# Patient Record
Sex: FEMALE | Race: WHITE
[De-identification: names, ages, dates, MRNs, and addresses within clinical notes are randomized per-mention and may not be internally consistent; named-entity substitution may affect disease eponyms.]

---

## 2017-02-11 ENCOUNTER — HOSPITAL ENCOUNTER (EMERGENCY)
Dept: HOSPITAL 61 - ER | Age: 27
Discharge: HOME | End: 2017-02-11
Payer: COMMERCIAL

## 2017-02-11 VITALS
SYSTOLIC BLOOD PRESSURE: 104 MMHG | DIASTOLIC BLOOD PRESSURE: 64 MMHG | SYSTOLIC BLOOD PRESSURE: 104 MMHG | DIASTOLIC BLOOD PRESSURE: 64 MMHG | DIASTOLIC BLOOD PRESSURE: 64 MMHG | SYSTOLIC BLOOD PRESSURE: 104 MMHG

## 2017-02-11 VITALS — BODY MASS INDEX: 32.14 KG/M2 | HEIGHT: 66 IN | WEIGHT: 200 LBS

## 2017-02-11 DIAGNOSIS — E87.6: ICD-10-CM

## 2017-02-11 DIAGNOSIS — Z90.49: ICD-10-CM

## 2017-02-11 DIAGNOSIS — R19.7: ICD-10-CM

## 2017-02-11 DIAGNOSIS — Z88.5: ICD-10-CM

## 2017-02-11 DIAGNOSIS — N39.0: Primary | ICD-10-CM

## 2017-02-11 LAB
ALBUMIN SERPL-MCNC: 3.8 G/DL (ref 3.4–5)
ALBUMIN/GLOB SERPL: 0.9 {RATIO} (ref 1–1.7)
ALP SERPL-CCNC: 58 U/L (ref 46–116)
ALT SERPL-CCNC: 24 U/L (ref 14–59)
ANION GAP SERPL CALC-SCNC: 16 MMOL/L (ref 6–14)
AST SERPL-CCNC: 37 U/L (ref 15–37)
BACTERIA #/AREA URNS HPF: 0 /HPF
BASOPHILS # BLD AUTO: 0 X10^3/UL (ref 0–0.2)
BASOPHILS NFR BLD: 0 % (ref 0–3)
BILIRUB SERPL-MCNC: 0.4 MG/DL (ref 0.2–1)
BILIRUB UR QL STRIP: NEGATIVE
BUN SERPL-MCNC: 19 MG/DL (ref 7–20)
BUN/CREAT SERPL: 21 (ref 6–20)
CALCIUM SERPL-MCNC: 9 MG/DL (ref 8.5–10.1)
CHLORIDE SERPL-SCNC: 101 MMOL/L (ref 98–107)
CO2 SERPL-SCNC: 20 MMOL/L (ref 21–32)
CREAT SERPL-MCNC: 0.9 MG/DL (ref 0.6–1)
EOSINOPHIL NFR BLD: 1 % (ref 0–3)
ERYTHROCYTE [DISTWIDTH] IN BLOOD BY AUTOMATED COUNT: 13.1 % (ref 11.5–14.5)
GFR SERPLBLD BASED ON 1.73 SQ M-ARVRAT: 74.6 ML/MIN
GLOBULIN SER-MCNC: 4.4 G/DL (ref 2.2–3.8)
GLUCOSE SERPL-MCNC: 75 MG/DL (ref 70–99)
GLUCOSE UR STRIP-MCNC: NEGATIVE MG/DL
HCT VFR BLD CALC: 45 % (ref 36–47)
HGB BLD-MCNC: 15.4 G/DL (ref 12–15.5)
LYMPHOCYTES # BLD: 1.4 X10^3/UL (ref 1–4.8)
LYMPHOCYTES NFR BLD AUTO: 17 % (ref 24–48)
MCH RBC QN AUTO: 30 PG (ref 25–35)
MCHC RBC AUTO-ENTMCNC: 34 G/DL (ref 31–37)
MCV RBC AUTO: 86 FL (ref 79–100)
MONOCYTES NFR BLD: 8 % (ref 0–9)
NEUTROPHILS NFR BLD AUTO: 74 % (ref 31–73)
NITRITE UR QL STRIP: NEGATIVE
PH UR STRIP: 6.5 [PH]
PLATELET # BLD AUTO: 264 X10^3/UL (ref 140–400)
POTASSIUM SERPL-SCNC: 3 MMOL/L (ref 3.5–5.1)
PROT SERPL-MCNC: 8.2 G/DL (ref 6.4–8.2)
PROT UR STRIP-MCNC: 30 MG/DL
RBC # BLD AUTO: 5.23 X10^6/UL (ref 3.5–5.4)
RBC #/AREA URNS HPF: 0 /HPF (ref 0–2)
SODIUM SERPL-SCNC: 137 MMOL/L (ref 136–145)
SP GR UR STRIP: >=1.03
SQUAMOUS #/AREA URNS LPF: (no result) /LPF
UROBILINOGEN UR-MCNC: 0.2 MG/DL
WBC # BLD AUTO: 8.6 X10^3/UL (ref 4–11)
WBC #/AREA URNS HPF: (no result) /HPF (ref 0–4)

## 2017-02-11 PROCEDURE — 81001 URINALYSIS AUTO W/SCOPE: CPT

## 2017-02-11 PROCEDURE — S0028 INJECTION, FAMOTIDINE, 20 MG: HCPCS

## 2017-02-11 PROCEDURE — 96361 HYDRATE IV INFUSION ADD-ON: CPT

## 2017-02-11 PROCEDURE — 99285 EMERGENCY DEPT VISIT HI MDM: CPT

## 2017-02-11 PROCEDURE — 81025 URINE PREGNANCY TEST: CPT

## 2017-02-11 PROCEDURE — 74177 CT ABD & PELVIS W/CONTRAST: CPT

## 2017-02-11 PROCEDURE — 36415 COLL VENOUS BLD VENIPUNCTURE: CPT

## 2017-02-11 PROCEDURE — 83690 ASSAY OF LIPASE: CPT

## 2017-02-11 PROCEDURE — 85027 COMPLETE CBC AUTOMATED: CPT

## 2017-02-11 PROCEDURE — 96375 TX/PRO/DX INJ NEW DRUG ADDON: CPT

## 2017-02-11 PROCEDURE — 80053 COMPREHEN METABOLIC PANEL: CPT

## 2017-02-11 PROCEDURE — 96365 THER/PROPH/DIAG IV INF INIT: CPT

## 2017-02-11 NOTE — RAD
ABD PELV W/ IV CONTRAST ONLY



History:Upper abdominal pain, right upper quadrant pain, diarrhea.



Technique: After administration of intravenous contrast, CT imaging was

performed of the abdomen and pelvis, multiplanar reconstruction images

submitted. No oral contrast was given as per request. Exposure: One or more of

the following individualized dose reduction techniques were utilized for this

exam: 1. Automated exposure control.

2. Adjustment of the mA and/or KV according to patient size. 3. Use of

iterative reconstruction technique.



Contrast: 75 cc Omnipaque 300.



Comparison: None



Findings:There is no abnormality of the limited visualized lung bases. There

has been cholecystectomy. No focal abnormality is identified of the liver,

spleen, pancreas. Both kidneys enhance, no hydronephrosis. Incidental note is

made of a retroaortic left renal vein. Focus of hypodensity near the left

adrenal gland is likely due to gastric diverticulum. There is no discrete

adrenal nodularity. Appendix caliber is upper limits of normal 0.6 cm although

mostly filled with gas, no significant adjacent inflammatory type change.

Accurate evaluation of bowel is limited without oral contrast. Bowel is not

considered significantly dilated. There is no free air. There is some fluid

within the colon as may be seen with diarrheal state. There is no appreciable

bowel wall thickening. There is IUD present. There is no significant

extraluminal fluid collection. There is mild sigmoid diverticulosis. 





Impression:

1.There are some air-fluid levels of the colon which may be seen with

diarrheal state. Appendix caliber is considered borderline 0.6 cm although no

other findings suggestive of acute appendicitis. There is mild sigmoid

diverticulosis.

2. There has been cholecystectomy.

3. There is incidental retroaortic left renal vein.

## 2017-02-11 NOTE — PHYS DOC
Past Medical History


Past Medical History:  Anxiety


Past Surgical History:  Cholecystectomy, Tonsillectomy


Additional Past Surgical Histo:  [place] teeth


Alcohol Use:  Occasionally


Drug Use:  None





Adult General


Chief Complaint


Chief Complaint:  ABDOMINAL PAIN





HPI


HPI


Patient is a 26  year old female with history of anxiety who presents today 

with moderate bilateral upper abdominal pain cramping in nature with diarrhea 

that began 3 days ago. Patient denies any nausea vomiting. She has history of 

cholecystectomy. Patient denies any fever urgency frequency dysuria. She 

appears uncomfortable.





Review of Systems


Review of Systems





Constitutional: Denies fever or chills []


Eyes: Denies change in visual acuity, redness, or eye pain []


HENT: Denies nasal congestion or sore throat []


Respiratory: Denies cough or shortness of breath []


Cardiovascular: No additional information not addressed in HPI []


GI: abdominal pain, and diarrhea []


: See history of present illness


Musculoskeletal: Denies back pain or joint pain []


Integument: Denies rash or skin lesions []


Neurologic: Denies headache, focal weakness or sensory changes []


Endocrine: Denies polyuria or polydipsia []





Current Medications


Current Medications








 Current Medications








 Medications


  (Trade)  Dose


 Ordered  Sig/Cinthya  Start Time


 Stop Time Status Last Admin


Dose Admin


 


 Ciprofloxacin


 Lactate


  (Cipro 400mg


 Premix)  200 ml @ 


 200 mls/hr  1X  STAT  2/11/17 12:16


 2/11/17 13:15 DC 2/11/17 12:16


200 MLS/HR


 


 Dicyclomine HCl


  (Bentyl)  20 mg  1X  ONCE  2/11/17 11:15


 2/11/17 11:16 DC 2/11/17 11:26


20 MG


 


 Famotidine


  (Pepcid)  20 mg  1X  ONCE  2/11/17 11:15


 2/11/17 11:16 DC 2/11/17 11:26


20 MG


 


 Fentanyl Citrate


  (Fentanyl 2ml


 Vial)  50 mcg  1X  ONCE  2/11/17 11:15


 2/11/17 11:16 DC 2/11/17 11:26


50 MCG


 


 Info 1 each  1 each  PRN DAILY  PRN  2/11/17 11:45


 2/13/17 11:44   


 


 


 Iohexol


  (Omnipaque 300


 Mg/ml)  75 ml  1X  ONCE  2/11/17 11:45


 2/11/17 11:46 DC 2/11/17 11:45


75 ML


 


 Ondansetron HCl


  (Zofran)  4 mg  1X  ONCE  2/11/17 11:15


 2/11/17 11:16 DC 2/11/17 11:26


4 MG


 


 Potassium Chloride


  (Klor-Con)  40 meq  1X  ONCE  2/11/17 12:30


 2/11/17 12:31 DC 2/11/17 12:30


40 MEQ


 


 Sodium Chloride


  (Iv Sodium


 Chloride 0.9%


 1000ml Bag)  1,000 ml @ 


 1,000 mls/hr  1X  ONCE  2/11/17 11:15


 2/11/17 12:14 DC 2/11/17 11:26


1,000 MLS/HR














Allergies


Allergies





 Allergies








Coded Allergies Type Severity Reaction Last Updated Verified


 


  codeine Allergy Intermediate  2/6/16 Yes











Physical Exam


Physical Exam





Constitutional: Well developed, well nourished, non-toxic appearance. Patient 

is in mild distress. She appears to be uncomfortable in pain.


HENT: Normocephalic, atraumatic, bilateral external ears normal, oropharynx 

moist, no oral exudates, nose normal. []


Eyes: PERRLA, EOMI, conjunctiva normal, no discharge. [] 


Neck: Normal range of motion, no tenderness, supple, no stridor. [] 


Cardiovascular:Heart rate regular rhythm, no murmur []


Lungs & Thorax:  Bilateral breath sounds clear to auscultation []


Abdomen: Bowel sounds normal, soft, no masses, no pulsatile masses. Diffuse 

tenderness to bilateral upper abdomen. Negative Carmona sign. No tenderness to 

the right lower quadrant. Negative psoas sign, negative obturator sign, 

negative Rovsing sign, no guarding no rebound pain or tenderness.


Skin: Warm, dry, no erythema, no rash. [] 


Back: No tenderness, no CVA tenderness. [] 


Extremities: No tenderness, no cyanosis, no clubbing, ROM intact, no edema. [] 


Neurologic: Alert and oriented X 3, normal motor function, normal sensory 

function, no focal deficits noted. []


Psychologic: Affect normal, judgement normal, mood normal. []





Current Patient Data


Vital Signs





 Vital Signs








  Date Time  Temp Pulse Resp B/P Pulse Ox O2 Delivery O2 Flow Rate FiO2


 


2/11/17 11:26   18     


 


2/11/17 11:02 97.8 97  122/80 99 Room Air  





 97.8       








Lab Values





 Laboratory Tests








Test


  2/11/17


10:43 2/11/17


11:14


 


Urine Collection Type Unknown   


 


Urine Color Yellow   


 


Urine Clarity Clear   


 


Urine pH 6.5   


 


Urine Specific Gravity >=1.030   


 


Urine Protein


  30mg/dL


(NEG-TRACE) 


 


 


Urine Glucose (UA)


  Negativemg/dL


(NEG) 


 


 


Urine Ketones (Stick)


  >=80mg/dL


(NEG) 


 


 


Urine Blood


  Negative (NEG)


  


 


 


Urine Nitrite


  Negative (NEG)


  


 


 


Urine Bilirubin


  Negative (NEG)


  


 


 


Urine Urobilinogen Dipstick


  0.2mg/dL (0.2


mg/dL) 


 


 


Urine Leukocyte Esterase


  Moderate (NEG)


  


 


 


Urine RBC 0/HPF (0-2)   


 


Urine WBC 1-4/HPF (0-4)   


 


Urine Squamous Epithelial


Cells Few/LPF  


  


 


 


Urine Bacteria 0/HPF (0-FEW)   


 


White Blood Count


  


  8.6x10^3/uL


(4.0-11.0)


 


Red Blood Count


  


  5.23x10^6/uL


(3.50-5.40)


 


Hemoglobin


  


  15.4g/dL


(12.0-15.5)


 


Hematocrit


  


  45.0%


(36.0-47.0)


 


Mean Corpuscular Volume  86fL ()  


 


Mean Corpuscular Hemoglobin  30pg (25-35)  


 


Mean Corpuscular Hemoglobin


Concent 


  34g/dL (31-37)


 


 


Red Cell Distribution Width


  


  13.1%


(11.5-14.5)


 


Platelet Count


  


  264x10^3/uL


(140-400)


 


Neutrophils (%) (Auto)  74% (31-73)  H


 


Lymphocytes (%) (Auto)  17% (24-48)  L


 


Monocytes (%) (Auto)  8% (0-9)  


 


Eosinophils (%) (Auto)  1% (0-3)  


 


Basophils (%) (Auto)  0% (0-3)  


 


Neutrophils # (Auto)


  


  6.4x10^3uL


(1.8-7.7)


 


Lymphocytes # (Auto)


  


  1.4x10^3/uL


(1.0-4.8)


 


Monocytes # (Auto)


  


  0.7x10^3/uL


(0.0-1.1)


 


Eosinophils # (Auto)


  


  0.1x10^3/uL


(0.0-0.7)


 


Basophils # (Auto)


  


  0.0x10^3/uL


(0.0-0.2)


 


Sodium Level


  


  137mmol/L


(136-145)


 


Potassium Level


  


  3.0mmol/L


(3.5-5.1)  L


 


Chloride Level


  


  101mmol/L


()


 


Carbon Dioxide Level


  


  20mmol/L


(21-32)  L


 


Anion Gap  16 (6-14)  H


 


Blood Urea Nitrogen


  


  19mg/dL (7-20)


 


 


Creatinine


  


  0.9mg/dL


(0.6-1.0)


 


Estimated GFR


(Cockcroft-Gault) 


  74.6  


 


 


BUN/Creatinine Ratio  21 (6-20)  H


 


Glucose Level


  


  75mg/dL


(70-99)


 


Calcium Level


  


  9.0mg/dL


(8.5-10.1)


 


Total Bilirubin


  


  0.4mg/dL


(0.2-1.0)


 


Aspartate Amino Transferase


(AST) 


  37U/L (15-37)  


 


 


Alanine Aminotransferase (ALT)  24U/L (14-59)  


 


Alkaline Phosphatase


  


  58U/L ()


 


 


Total Protein


  


  8.2g/dL


(6.4-8.2)


 


Albumin


  


  3.8g/dL


(3.4-5.0)


 


Albumin/Globulin Ratio


  


  0.9 (1.0-1.7)


L


 


Lipase


  


  142U/L


()





 Laboratory Tests


2/11/17 11:14








 Laboratory Tests


2/11/17 11:14











EKG


EKG


[]





Radiology/Procedures


Radiology/Procedures


[]





Course & Med Decision Making


Course & Med Decision Making


Pertinent Labs and Imaging studies reviewed. (See chart for details)





Patient is in the ED with bilateral upper abdominal pain with diarrhea that 

began 3 days ago. She appeared very uncomfortable and in pain on arrival to the 

ED. urine positive for UTI with moderate amount of leukocytes. CBC with no 

acute findings, CMP with potassium of 3.0. Lipase is normal. She was given 40 

mEq of potassium in the ED and encouraged to increase her dietary potassium 

intake.





CT of the abdomen and pelvic is noted for diarrhea otherwise no acute findings.





Patient was given Cipro IV in the ED, she was also given 1 L of IV fluid and 

fentanyl. Her pain is well controlled, she is very comfortable. Vitals are 

normal





She was discharged with Cipro, she was also discharged with hydrocodone Zofran 

and Lomotil. She is instructed to push fluids. She is to follow-up with her own 

doctor in 2-5. She is provided return precautions and discharged in stable 

condition.





Dragon Disclaimer


Dragon Disclaimer


This electronic medical record was generated, in whole or in part, using a 

voice recognition dictation system.





Departure


Departure


Impression:  


 Primary Impression:  


 Diarrhea


 Additional Impressions:  


 Urinary (tract) obstruction


 Hypokalemia


Disposition:  01 HOME, SELF-CARE


Condition:  STABLE


Referrals:  


ARA SCHMIDT MD (PCP)


Follow-up with your primary care doctor in 2-5 days


Patient Instructions:  Diarrhea, Hypokalemia-Brief, Urinary Tract Infection





Additional Instructions:


You were seen for diarrhea, abdominal pain, and urinary tract infection. Your 

potassium was also low, increase your dietary potassium intake through foods 

like bananas. Ensure you complete your antibiotics. Push fluids and maintaining 

good hand hygiene. Advance your diet slowly starting with clear liquid diet 

today. Come back to the emergency room at any point his symptoms worsen. Follow-

up with your own doctor as soon as he can.


Scripts


Dicyclomine Hcl 20 Mg Tablet1 Tab PO TID #20 TAB  Ref 1


   Prov:LARA CLAUDIO         2/11/17


Diphenoxylate Hcl/Atropine (Lomotil Tablet)1 Each Tablet1 Tab PO TID PRN 

DIARRHEA #20 TAB


   Prov:LARA CLAUDIO         2/11/17


Ondansetron (Zofran Odt)4 Mg Tab.rapdis1 Tab SL Q8HRS #15 TAB


   Prov:LARA CLAUDIO         2/11/17


Hydrocodone/Apap 5-325 (Norco 5-325 Tablet)1 Each Tablet1-2 Tab PO Q4-6HRS #20 

TAB


   Prov:LARA CLAUDIO         2/11/17


Ciprofloxacin Hcl (Cipro)500 Mg Tablet1 Tab PO BID #14 TAB


   Prov:LARA CLAUDIO         2/11/17





Problem Qualifiers








 Primary Impression:  


 Diarrhea


 Diarrhea type:  unspecified type  Qualified Code:  R19.7 - Diarrhea, 

unspecified





LARA CLAUDIO Feb 11, 2017 11:13

## 2020-07-28 ENCOUNTER — HOSPITAL ENCOUNTER (OUTPATIENT)
Dept: HOSPITAL 61 - KCIC US | Age: 30
Discharge: HOME | End: 2020-07-28
Attending: FAMILY MEDICINE
Payer: COMMERCIAL
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
## 2020-07-28 DIAGNOSIS — T83.32XA: Primary | ICD-10-CM
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
## 2020-07-28 PROCEDURE — 76856 US EXAM PELVIC COMPLETE: CPT

## 2020-07-28 NOTE — KCIC
EXAM: Pelvic sonogram.

 

HISTORY: IUD misplacement.

 

TECHNIQUE: Sonographic imaging of pelvis was performed.

 

COMPARISON: None.

 

FINDINGS: The uterus measures 8.5 x 5.8 x 4.4 cm. The endometrial stripe 

measures 4 mm. There is an intrauterine contraceptive device within the 

endometrial cavity. The ovaries are normal in size and demonstrate normal 

blood flow. There are dominant bilateral ovarian follicles measuring 1.8 

cm in the right and 1.5 cm on the left. There is trace pelvic free fluid.

 

IMPRESSION:

1. IUD within expected position in the endometrial cavity.

2. Dominant bilateral ovarian follicles measuring 1.8 cm of the right and 

1.5 cm the left.

2. Trace pelvic free fluid.

 

Electronically signed by: Belén Coombs MD (7/28/2020 2:55 PM) [id number]

## 2021-07-16 ENCOUNTER — HOSPITAL ENCOUNTER (OUTPATIENT)
Dept: HOSPITAL 61 - MRI | Age: 31
End: 2021-07-16
Attending: FAMILY MEDICINE
Payer: COMMERCIAL

## 2021-07-16 DIAGNOSIS — M25.561: Primary | ICD-10-CM

## 2021-07-16 PROCEDURE — 73721 MRI JNT OF LWR EXTRE W/O DYE: CPT

## 2021-07-16 NOTE — RAD
STUDY: MRI of the right knee without contrast



INDICATION: Right knee pain. 



COMPARISON: None.



TECHNIQUE: Multiplanar MR imaging of the right knee performed without the use of intravenous or intra
-articular contrast.



FINDINGS:



Menisci: The medial and lateral menisci are intact.



Cruciate ligaments: Intact ACL and PCL.



Collateral ligaments: Unremarkable medial and lateral collateral ligaments. Intact retinacula and IT 
band. 



Tendons: Unremarkable.



Cartilage:



Patellofemoral: A few areas of patellar chondral signal heterogeneity but without a well delineated d
efect. The trochlear cartilage is intact.

Lateral compartment: Intact.

Medial compartment: Intact. 



Bones: Partially imaged cortically-based hypointense signal at the posteromedial aspect of the distal
 femoral diaphysis typical of a healed fibrous cortical defect. No fracture or focally aggressive mar
row signal abnormality. TT-TG distance of approximately 16 mm.



Miscellaneous: Normal volume knee joint fluid. Unremarkable superficial soft tissues and fat pads. 



IMPRESSION:



No internal derangement of the right knee. There is heterogeneity of the patellar cartilage at a few 
locations which may represent early chondromalacia versus artifact. No well delineated chondral defec
t at this location or elsewhere.



Electronically signed by: RAFI MCCOY MD (7/16/2021 10:44 AM) [id number]

## 2021-09-07 NOTE — EKG
[facility]

              [address] [place] [address]

Test Date:    2021               Test Time:    21:47:45

Pat Name:     CHEVY CRAIG         Department:   

Patient ID:   [id number]           Room:          

Gender:       F                        Technician:   

:          [id number]               Requested By: FARSHAD CONTRERAS

Order Number: [id number]           Reading MD:   Rhett Sarah MD

                                 Measurements

Intervals                              Axis          

Rate:         100                      P:            54

SC:           162                      QRS:          56

QRSD:         80                       T:            40

QT:           340                                    

QTc:          442                                    

                           Interpretive Statements

SINUS RHYTHM

Electronically Signed On 2021 9:07:15 CDT by Rhett Sarah MD

## 2021-09-08 NOTE — PHYS DOC
Past Medical History


Past Medical History:  Anxiety


Past Surgical History:  Cholecystectomy, Tonsillectomy, Other


Additional Past Surgical Histo:  [place] teeth


Smoking Status:  Never Smoker


Alcohol Use:  Occasionally


Drug Use:  None





General Adult


EDM:


Chief Complaint:  VAGINAL BLEEDING PREGNANCY





HPI:


HPI:


29 yo  approximately 2 months pregnant (confirmed via pregnancy test at 

pcps' office), presents the ED with complaints of vaginal bleeding that started 

20 minutes just prior to ED arrival.  Patient reports she had OB/GYN follow-up 

scheduled for Friday (Dr. Wills).  Past surgical history tonsils and 

adenoids, wisdom teeth and cholecystectomy.  Ports history of iron deficiency 

anemia but has never required any blood transfusions.  While in waiting room 

restroom patient is single episode witnessed by her boyfriend, (patient consents

to his/her/their knowledge and involvement in pts' medical care), who reports he

caught her, patient denied her head, no incontinence.  Patient states she all of

a sudden felt nauseous, dizzy and flushed.  History of syncope in the past 

related to low iron.  No underlying cardiac abnormalities.  Once actively trying

to get pregnant.  Does report increased anxiety while in the ED stating " this 

is the worst anxiety attack if ever had." Reports a very stressful weekend 

regarding one of her children's mental health that required admission or 

transport to hospital.  Is concerned her high stress levels and anxiety may have

caused her vaginal bleeding-is no longer on her antidepressant to this 

pregnancy.  Patient denies any active abdominal or back pain or chest pain.





Review of Systems:


Review of Systems:


Constitutional:   Denies fever or chills. []


Eyes:   Denies change in visual acuity. []


HENT:   Denies nasal congestion or sore throat. [] 


Respiratory:   Denies cough or shortness of breath. [] 


Cardiovascular:   Denies chest pain or edema. [] 


GI:   Denies abdominal pain, nausea, vomiting, bloody stools or diarrhea. [] 


:  Denies dysuria or hematuria


Musculoskeletal:   Denies back pain or joint pain. [] 


Integument:   Denies rash or blistering lesions


Neurologic:   Denies headache, neck pain, focal weakness or sensory changes. [] 


Endocrine:   Denies polyuria or polydipsia. [] 


Lymphatic:  Denies swollen glands. [] 


Psychiatric:  Denies depression or suicidal ideations





Heart Score:


C/O Chest Pain:  No


Risk Factors:


Risk Factors:  DM, Current or recent (<one month) smoker, HTN, HLP, family 

history of CAD, obesity.


Risk Scores:


Score 0 - 3:  2.5% MACE over next 6 weeks - Discharge Home


Score 4 - 6:  20.3% MACE over next 6 weeks - Admit for Clinical Observation


Score 7 - 10:  72.7% MACE over next 6 weeks - Early Invasive Strategies





Current Medications:





Current Medications








 Medications


  (Trade)  Dose


 Ordered  Sig/Cinthya  Start Time


 Stop Time Status Last Admin


Dose Admin


 


 Acetaminophen


  (Tylenol)  650 mg  1X  ONCE  21 22:30


 21 22:31 DC 21 22:33


650 MG


 


 Lorazepam


  (Ativan Inj)  0.5 mg  1X  ONCE  21 22:30


 21 22:31 DC 21 22:27


0.5 MG


 


 Sodium Chloride  1,000 ml @ 


 1,000 mls/hr  1X  ONCE  21 22:00


 21 22:59 DC 21 21:30


1,000 MLS/HR











Allergies:


Allergies:





Allergies








Coded Allergies Type Severity Reaction Last Updated Verified


 


  codeine Allergy Intermediate  16 Yes











Physical Exam:


PE:





Constitutional: Afebrile, nontoxic-appearing


HENT: Normocephalic, atraumatic,


Eyes: EOMI, conjunctiva normal, no discharge.  


Neck: Normal range of motion,  supple, no midline neck pain


Cardiovascular: S1/2 present, tachycardic-108


Lungs & Thorax: Speaking in full sentences, bilateral equal chest rise, no 

tachypnea or increased work of breathing


Abdomen:  soft, no tenderness, no rigidity or guarding


Skin: Warm, dry, 


Extremities: No tenderness, no cyanosis, no lower extremity edema


Neurologic: Alert and oriented X 3, normal motor function, normal sensory 

function, no focal deficits noted. []


Psychologic: judgement normal, extremely anxious/tremulous and tearful


Pelvic: Chaperoned by RN, external genitalia normal, +vaginal bleeding-large 

amount expelled from vaginal vault with valsalva, cervical os slightly open, no 

cervical erythema, no CMT or adnexal tenderness, tolerated exam well





Current Patient Data:


Labs:





                                Laboratory Tests








Test


 21


21:45 21


00:40


 


White Blood Count


 9.2 x10^3/uL


(4.0-11.0) 





 


Red Blood Count


 4.24 x10^6/uL


(3.50-5.40) 





 


Hemoglobin


 12.9 g/dL


(12.0-15.5) 





 


Hematocrit


 37.7 %


(36.0-47.0) 





 


Mean Corpuscular Volume


 89 fL ()


 





 


Mean Corpuscular Hemoglobin 31 pg (25-35)   


 


Mean Corpuscular Hemoglobin


Concent 34 g/dL


(31-37) 





 


Red Cell Distribution Width


 12.9 %


(11.5-14.5) 





 


Platelet Count


 284 x10^3/uL


(140-400) 





 


Neutrophils (%) (Auto) 61 % (31-73)   


 


Lymphocytes (%) (Auto) 29 % (24-48)   


 


Monocytes (%) (Auto) 7 % (0-9)   


 


Eosinophils (%) (Auto) 2 % (0-3)   


 


Basophils (%) (Auto) 0 % (0-3)   


 


Neutrophils # (Auto)


 5.6 x10^3/uL


(1.8-7.7) 





 


Lymphocytes # (Auto)


 2.7 x10^3/uL


(1.0-4.8) 





 


Monocytes # (Auto)


 0.6 x10^3/uL


(0.0-1.1) 





 


Eosinophils # (Auto)


 0.2 x10^3/uL


(0.0-0.7) 





 


Basophils # (Auto)


 0.0 x10^3/uL


(0.0-0.2) 





 


Prothrombin Time


 12.7 SEC


(11.7-14.0) 





 


Prothrombin Time INR 1.0 (0.8-1.1)   


 


Activated Partial


Thromboplast Time 29 SEC (24-38)


 





 


Maternal Serum HCG Beta


Subunit 3257 mIU/mL


(0-5)  H 





 


Sodium Level


 142 mmol/L


(136-145) 





 


Potassium Level


 3.7 mmol/L


(3.5-5.1) 





 


Chloride Level


 105 mmol/L


() 





 


Carbon Dioxide Level


 25 mmol/L


(21-32) 





 


Anion Gap 12 (6-14)   


 


Blood Urea Nitrogen


 14 mg/dL


(7-20) 





 


Creatinine


 0.8 mg/dL


(0.6-1.0) 





 


Estimated GFR


(Cockcroft-Gault) 83.1  


 





 


BUN/Creatinine Ratio 18 (6-20)   


 


Glucose Level


 98 mg/dL


(70-99) 





 


Calcium Level


 8.8 mg/dL


(8.5-10.1) 





 


Magnesium Level


 2.0 mg/dL


(1.8-2.4) 





 


Total Bilirubin


 0.2 mg/dL


(0.2-1.0) 





 


Aspartate Amino Transferase


(AST) 17 U/L (15-37)


 





 


Alanine Aminotransferase (ALT)


 23 U/L (14-59)


 





 


Alkaline Phosphatase


 53 U/L


() 





 


Troponin I Quantitative


 < 0.017 ng/mL


(0.000-0.055) < 0.017 ng/mL


(0.000-0.055)


 


Total Protein


 7.2 g/dL


(6.4-8.2) 





 


Albumin


 3.5 g/dL


(3.4-5.0) 





 


Albumin/Globulin Ratio


 0.9 (1.0-1.7)


L 








                                Laboratory Tests


21 21:45








                                Laboratory Tests


21 21:45








Vital Signs:





                                   Vital Signs








  Date Time  Temp Pulse Resp B/P (MAP) Pulse Ox O2 Delivery O2 Flow Rate FiO2


 


21 00:44  88  81/43 (56) 96 Room Air  


 


21 23:59   13     


 


21 21:25 98.1       





 98.1       











EKG:


EKG:


Sinus rhythm 100 bpm, no axis deviation, normal intervals, no T wave inversions,

 no ST elevations or ST depressions, no active chest pain





Radiology/Procedures:


Radiology/Procedures:


                                 IMAGING REPORT





                                     Signed





PATIENT: CHEVY CRAIG RACCOUNT: [id number]     MRN#: [id number]


: 1990           LOCATION: ER              AGE: 30


SEX: F                    EXAM DT: 21         ACCESSION#: [id number]


STATUS: REG ER            ORD. PHYSICIAN: FARSHAD CONTRERAS DO


REASON: vb in pregnancy


PROCEDURE: OB TRANSVAG





Obstetric ultrasound less than 14 weeks with transvaginal:





Reason for examination: Vaginal bleeding with pregnancy. HCG quadrant 3257.





Transvaginal ultrasound of the pelvis was performed.





Uterus measures 11 x 5.9 x 5.3 cm in greatest dimensions without a focal mass. 

There is a small amount of fluid within the endometrial cavity. The endometrium 

is thickened at 2.5 cm. No intrauterine or ectopic gestation is seen at this 

time.





Right ovary measures 3.5 x 2.7 x 2.1 cm in greatest dimension and shows good 

vascular flow. There is however a right ovarian cyst present measuring 

approximately 2 cm in greatest dimension.





Left ovary measures 2.5 x 1.7 x 1.6 cm in greatest dimension with good vascular 

flow and contains a few small follicles.





No free fluid is present in the pelvis.





IMPRESSION:





Small amount of fluid within the endometrial cavity and endometrium is thickened

 at 2.5 cm.


No evidence of intrauterine or ectopic gestation. 


2 cm cyst in the right ovary.





Electronically signed by: Shereen Ny MD (2021 12:05 AM) [facility]














DICTATED and SIGNED BY:     SHEREEN NY MD


DATE:     21 8724UMP0 0





Course & Med Decision Making:


Course & Med Decision Making


Pertinent Labs and Imaging studies reviewed. (See chart for details)





Concern for threatened miscarriage versus incomplete , but cannot 

definitely exclude ectopic pregnancy (this was thoroughly explained to patient 

and her male partner at bedside).  HCG above 3000 with no GS or YS -us w/no 

findings of ectopic or intrauterine pregnancy.  I discussed this with Dr. Lucas,

 OB/GYN who recommends urgent follow-up in 1 to 2 days for repeat hCG.  Patient 

has gone through 2 pads in the ED, vaginal bleeding is not brisk and patient has

 no abdominal or back pain.  H&H is stable.  Heart rate now wnl, s/p IVFs and IV

 Ativan. Concern for vasovagal syncope secondary to emotion/anxiety.  Will 

discharge home with strict ED return precautions were given for worsening vagin

al bleeding, abdominal cramping or pain, back pain, exertional dyspnea, chest 

pain or syncope. Encouraged urgent outpatient follow-up with PMD and OB/GYN 

within 24 to 48 hours for repeat ECG.  Life-threatening processes were 

considered but are low suspicion at this time, given history, physical exam and 

ED workup. Pt was educated on all prescription medications and adverse effects. 

 All patient's questions were answered and pt was stable at time of discharge.





Life/limb-threatening differential includes but is not limited to, ectopic 

pregnancy, septic , sepsis/infection (endometritis, sti/pid,  cystitis, 

pyelonephritis, Guy's gangrene or necrotizing fasciitis, abscess), ovarian 

torsion, ruptured hemorrhagic ovarian cyst, endometriosis, ureterolithiasis, 

thrombophlebitis, hemorrhage/DIC, organ prolapse, abdominal aortic aneurysm, 

mesenteric ischemia, neoplasm, bowel obstruction or surgical abdomen.





I have spoken with the patient and/or caregivers.  I explained the patient's 

condition, diagnoses and treatment plan based on the information available to me

 at this time.  I have answered the patient and/or caregiver's questions and 

addressed any concerns.  The patient and/or caregivers have a good understanding

 of patient's diagnosis, condition and treatment plan as can be expected at this

 point.  Vital signs have been stable.  Patient's condition is stable and 

appropriate for discharge from the emergency department. 





Patient will pursue further outpatient evaluation with primary care physician or

 other designated or consulting physician as outlined in the discharge 

instructions.  The patient and/or caregivers are agreeable to this plan of care 

and follow-up instructions have been explained in detail.  The patient and/or 

caregivers have received these instructions in written form and have expressed 

an understanding of the discharge instructions.  The patient and/or caregivers 

are aware that any significant change of condition or worsening of symptoms 

should prompt immediate return to this or the closest emergency department or 

call to [facility] Disclaimer:


Dragon Disclaimer:


This electronic medical record was generated, in whole or in part, using a voice

 recognition dictation system.





Departure


Departure


Impression:  


   Primary Impression:  


   Vaginal bleeding during pregnancy


   Additional Impression:  


   Vasovagal syncope


Disposition:  01 HOME / SELF CARE / HOMELESS


Condition:  STABLE


Referrals:  


ARA SCHMIDT MD (PCP)


Follow-up with your primary care physician for routine care OR


FOLLOW UP WITH FAMILY MEDICINE:


[address], [address]


[place] [address]


Phone: [phone]


Patient Instructions:  Incomplete Miscarriage, Threatened Miscarriage, 

Easy-to-Read





Additional Instructions:  


FOLLOW UP WITH  OB/GYN:  FOR DEFINITIVE MANAGEMENT in 24- 48 hour to repeat hcg


[facility]


[address], [address]


[place] [address]


Phone: [phone]





EMERGENCY DEPARTMENT GENERAL DISCHARGE INSTRUCTIONS





Thank you for coming to [facility] Emergency Department (ED) 

today and 


trusting us with you care.  We trust that you had a positive experience in our 

Emergency 


Department.  If you wish to speak to the department management, you may call the

 Director at 


[phone].





YOUR FOLLOW UP INSTRUCTIONS ARE AS FOLLOWS:





1.  Do you have a private Doctor?  If you do not have a private doctor, please 

ask for a 


resource list of physicians or clinics that may be able to assist you with 

follow up care.





2.  The Emergency Physicain has interpreted your x-rays.  The X-Ray specialist 

will also 


review them.  If there is a change in the findings, you will be notified in 48 

hours when at 


all possible.





3.  A lab test or culture has been done, your results will be reviewed and you 

will be 


notified if you need a change in treatment.





ADDITIONAL INSTRUCTIONS AND INFORMATION:





1.  Your care today has been supervised by a physician who is specially trained 

in emergency 


care.  Many problems require more than one evaluation for a complete diagnosis a

nd 


treatment.  We recommend that you schedule your follow up appointment as 

recommended to 


ensure complete treatment of you illness or injury.  If you are unable to obtain

 follow up 


care and continue to have a problem, or if your condition worsens, we recommend 

that you 


return to the ED.





2.  We are not able to safely determine your condition over the phone nor are we

 able to 


give sound medical advice over the phone.  For these safety reasons, if you call

 for medical 


advice we will ask you to come to the ED for further evaluation.





3.  If you have any questions regarding these discharge instructions please call

 the ED at 


[phone].





SAFETY INFORMATION:





In the interest of safety, wellness, and injury prevention; we encourage you to 

wear your 


sealbelt, if you smoke; quite smoking, and we encourage family to use a 

protective helmet 


for bicycling and other sporting events that present an increased risk for head 

injury.





IF YOUR SYMPTOMS WORSEN OR NEW SYMPTOMS DEVELOP, OR YOU HAVE CONCERNS ABOUT YOUR

 CONDITION; 


OR IF YOUR CONDITION WORSENS WHILE YOU ARE WAITING FOR YOUR FOLLOW UP 

APPOINTMENT; EITHER 


CONTACT YOUR PRIMARY CARE DOCTOR, THE PHYSICIAN WHOSE NAME AND NUMBER YOU WERE 

GIVEN, OR 


RETURN TO THE ED IMMEDIATELY.











[facility] Hospital[facility]ARSHAD DO                Sep 8, 2021 01:31

## 2021-09-08 NOTE — RAD
Obstetric ultrasound less than 14 weeks with transvaginal:



Reason for examination: Vaginal bleeding with pregnancy. HCG quadrant 3257.



Transvaginal ultrasound of the pelvis was performed.



Uterus measures 11 x 5.9 x 5.3 cm in greatest dimensions without a focal mass. There is a small amoun
t of fluid within the endometrial cavity. The endometrium is thickened at 2.5 cm. No intrauterine or 
ectopic gestation is seen at this time.



Right ovary measures 3.5 x 2.7 x 2.1 cm in greatest dimension and shows good vascular flow. There is 
however a right ovarian cyst present measuring approximately 2 cm in greatest dimension.



Left ovary measures 2.5 x 1.7 x 1.6 cm in greatest dimension with good vascular flow and contains a f
ew small follicles.



No free fluid is present in the pelvis.



IMPRESSION:



Small amount of fluid within the endometrial cavity and endometrium is thickened at 2.5 cm.

No evidence of intrauterine or ectopic gestation. 

2 cm cyst in the right ovary.



Electronically signed by: Shereen Singletary MD (9/8/2021 12:05 AM) [facility]